# Patient Record
Sex: MALE | NOT HISPANIC OR LATINO | URBAN - METROPOLITAN AREA
[De-identification: names, ages, dates, MRNs, and addresses within clinical notes are randomized per-mention and may not be internally consistent; named-entity substitution may affect disease eponyms.]

---

## 2017-06-02 ENCOUNTER — IMPORTED ENCOUNTER (OUTPATIENT)
Dept: URBAN - METROPOLITAN AREA CLINIC 1 | Facility: CLINIC | Age: 67
End: 2017-06-02

## 2017-06-02 PROBLEM — H16.143: Noted: 2017-06-02

## 2017-06-02 PROBLEM — H25.813: Noted: 2017-06-02

## 2017-06-02 PROBLEM — H04.123: Noted: 2017-06-02

## 2017-06-02 PROBLEM — H35.3131: Noted: 2017-06-02

## 2017-06-02 PROBLEM — E11.9: Noted: 2017-06-02

## 2017-06-02 PROBLEM — H10.45: Noted: 2017-06-02

## 2017-06-02 PROBLEM — Z79.84: Noted: 2017-06-02

## 2017-06-02 PROCEDURE — 92014 COMPRE OPH EXAM EST PT 1/>: CPT

## 2017-06-02 PROCEDURE — 92250 FUNDUS PHOTOGRAPHY W/I&R: CPT

## 2017-06-02 PROCEDURE — 92015 DETERMINE REFRACTIVE STATE: CPT

## 2017-06-02 NOTE — PATIENT DISCUSSION
1.  DM Type II (Oral Meds) without sign of diabetic retinopathy and no blot heme on dilated retinal examination today OU No Macular Edema:  Discussed the pathophysiology of diabetes and its effect on the eye and risk of blindness. Stressed the importance of strong glucose control. Advised of importance of at least yearly dilated examinations but to contact us immediately for any problems or concerns. 2. Cataract OU: Observe for now without intervention. The patient was advised to contact us if any change or worsening of vision3. TOY w/ PEK OU-The use/continuation of artificial tears were recommended. 4.  ARMD OU Early/dry/stable. 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 Observe. Fm Hx. Importance of daily AREDS II study multivitamin and Amsler Grid checks discussed with patient. Patient to follow-up immediately with any new onset of decreased vision and/or metamorphopsia. 5. Allergic Conjunctivitis OU- Observe Letter to PCP Mrx for glasses given Return for an appointment in 6 mo 10 dfe with Dr. Nat Kaba.

## 2017-12-04 ENCOUNTER — IMPORTED ENCOUNTER (OUTPATIENT)
Dept: URBAN - METROPOLITAN AREA CLINIC 1 | Facility: CLINIC | Age: 67
End: 2017-12-04

## 2017-12-04 PROBLEM — E11.9: Noted: 2017-12-04

## 2017-12-04 PROBLEM — H10.45: Noted: 2017-12-04

## 2017-12-04 PROBLEM — H35.3131: Noted: 2017-12-04

## 2017-12-04 PROBLEM — Z79.84: Noted: 2017-12-04

## 2017-12-04 PROBLEM — H25.813: Noted: 2017-12-04

## 2017-12-04 PROBLEM — H04.123: Noted: 2017-12-04

## 2017-12-04 PROBLEM — H16.143: Noted: 2017-12-04

## 2017-12-04 PROCEDURE — 92012 INTRM OPH EXAM EST PATIENT: CPT

## 2017-12-04 NOTE — PATIENT DISCUSSION
1.  DM Type II (Oral Meds) without sign of diabetic retinopathy and no blot heme on dilated retinal examination today OU No Macular Edema:  Discussed the pathophysiology of diabetes and its effect on the eye and risk of blindness. Stressed the importance of strong glucose control. Advised of importance of at least yearly dilated examinations but to contact us immediately for any problems or concerns. 2. ARMD OU Early/dry/stable. Strong Fm hx. Importance of daily AREDS II study multivitamin and Amsler Grid checks discussed with patient. Patient to follow-up immediately with any new onset of decreased vision and/or metamorphopsia. 3. Cataract OU: Observe. 4.  TOY w/ increased PEK OU- Increase ATs to BID OU Routinely. 5.  Allergic Conjunctivitis OU- Zaditor BID OU PRN. Return for an appointment in 6 mo 30 glare with Dr. Mariajose Ridley.

## 2018-06-04 ENCOUNTER — IMPORTED ENCOUNTER (OUTPATIENT)
Dept: URBAN - METROPOLITAN AREA CLINIC 1 | Facility: CLINIC | Age: 68
End: 2018-06-04

## 2018-06-04 PROBLEM — Z79.84: Noted: 2018-06-04

## 2018-06-04 PROBLEM — H25.813: Noted: 2018-06-04

## 2018-06-04 PROBLEM — H35.3132: Noted: 2018-06-04

## 2018-06-04 PROBLEM — H16.143: Noted: 2018-06-04

## 2018-06-04 PROBLEM — E11.9: Noted: 2018-06-04

## 2018-06-04 PROBLEM — H04.123: Noted: 2018-06-04

## 2018-06-04 PROCEDURE — 92014 COMPRE OPH EXAM EST PT 1/>: CPT

## 2018-06-04 NOTE — PATIENT DISCUSSION
1.  DM Type II (Oral Medication). without sign of diabetic retinopathy and no blot heme on dilated retinal examination today OU No Macular Edema:  Discussed the pathophysiology of diabetes and its effect on the eye and risk of blindness. Stressed the importance of strong glucose control. Advised of importance of at least yearly dilated examinations but to contact us immediately for any problems or concerns. 2. TOY w/ PEK OU- Cont ATs TID OU Routinely. 3.  ARMD OU Intermediate/dry/stable. Importance of daily AREDS II study multivitamin and Amsler Grid checks discussed with patient. Patient to follow-up immediately with any new onset of decreased vision and/or metamorphopsia. 4. Cataract OU:  Visually Significant secondary to glare; Pt wishes to hold on scheduling till Fall. Will recheck patient in 3 months schedule Phaco. 5.  H/o Allergic Conjunctivitis OU- controlled. Cont Zaditor BID OU. Letter to PCP Return for an appointment in 3 mo 10 dfe glare MRx with Dr. Geovanna Joiner.

## 2018-06-04 NOTE — PATIENT DISCUSSION
ARMD OU Intermediate/dry/stable. Importance of daily AREDS II study multivitamin and Amsler Grid checks discussed with patient. Patient to follow-up immediately with any new onset of decreased vision and/or metamorphopsia.

## 2018-06-04 NOTE — PATIENT DISCUSSION
Cataract OU:  Visually Significant secondary to glare; Pt wishes to hold on scheduling till Fall.  Will recheck patient in 3 months schedule Phaco.

## 2018-09-06 ENCOUNTER — IMPORTED ENCOUNTER (OUTPATIENT)
Dept: URBAN - METROPOLITAN AREA CLINIC 1 | Facility: CLINIC | Age: 68
End: 2018-09-06

## 2018-09-06 PROBLEM — H25.813: Noted: 2018-09-06

## 2018-09-06 PROBLEM — H04.123: Noted: 2018-09-06

## 2018-09-06 PROBLEM — Z79.84: Noted: 2018-09-06

## 2018-09-06 PROBLEM — H16.143: Noted: 2018-09-06

## 2018-09-06 PROBLEM — H35.3132: Noted: 2018-09-06

## 2018-09-06 PROBLEM — E11.9: Noted: 2018-09-06

## 2018-09-06 PROCEDURE — 99213 OFFICE O/P EST LOW 20 MIN: CPT

## 2018-09-06 NOTE — PATIENT DISCUSSION
Cataract OU:  Visually Significant secondary to glare discussed the risks benefits alternatives and limitations of cataract surgery. The patient stated a full understanding and a desire to proceed with the procedure. The patient will need to return for preop appointment with cataract measurements and to have any additional questions answered and start pre-operative eye drops as directed.  Not MF candidatePhaco PCL OS then ODOtherwise follow-up 6mo/DFE

## 2018-09-06 NOTE — PATIENT DISCUSSION
1.  ARMD OU Mod/dry/stable. Importance of daily AREDS II study multivitamin and Amsler Grid checks discussed with patient. Patient to follow-up immediately with any new onset of decreased vision and/or metamorphopsia. 2. Cataract OU:  Visually Significant secondary to glare discussed the risks benefits alternatives and limitations of cataract surgery. The patient stated a full understanding and a desire to proceed with the procedure. The patient will need to return for preop appointment with cataract measurements and to have any additional questions answered and start pre-operative eye drops as directed. Not MF candidatePhaco PCL OS then ODOtherwise follow-up 6mo/DFE3. DM Type II without sign of diabetic retinopathy and no blot heme on dilated retinal examination today OU No Macular Edema:  Discussed the pathophysiology of diabetes and its effect on the eye and risk of blindness. Stressed the importance of strong glucose control. Advised of importance of at least yearly dilated examinations but to contact us immediately for any problems or concerns. 4. Type II diabetes controlled by oral medications. 5.  TOY w/ PEK OU-The increase of artificial tears QID OU Routinely were recommended. 6.  Return for an appointment for Ascan/HP with Dr. Reyna Kent.

## 2018-09-06 NOTE — PATIENT DISCUSSION
DM Type II without sign of diabetic retinopathy and no blot heme on dilated retinal examination today OU No Macular Edema:  Discussed the pathophysiology of diabetes and its effect on the eye and risk of blindness. Stressed the importance of strong glucose control. Advised of importance of at least yearly dilated examinations but to contact us immediately for any problems or concerns.

## 2018-10-22 ENCOUNTER — IMPORTED ENCOUNTER (OUTPATIENT)
Dept: URBAN - METROPOLITAN AREA CLINIC 1 | Facility: CLINIC | Age: 68
End: 2018-10-22

## 2018-10-22 PROBLEM — H25.812: Noted: 2018-10-22

## 2018-10-22 PROCEDURE — 92136 OPHTHALMIC BIOMETRY: CPT

## 2018-10-22 NOTE — PATIENT DISCUSSION
1. Cataract OS:  Visually Significant secondary to glare discussed the risks benefits alternatives and limitations of cataract surgery. The patient stated a full understanding and a desire to proceed with the procedure. Phaco PCL OS (Standard lens standard procedure) Return for an appointment for Return as scheduled with Dr. Mariajose Ridley.

## 2018-10-31 ENCOUNTER — IMPORTED ENCOUNTER (OUTPATIENT)
Dept: URBAN - METROPOLITAN AREA CLINIC 1 | Facility: CLINIC | Age: 68
End: 2018-10-31

## 2018-11-01 ENCOUNTER — IMPORTED ENCOUNTER (OUTPATIENT)
Dept: URBAN - METROPOLITAN AREA CLINIC 1 | Facility: CLINIC | Age: 68
End: 2018-11-01

## 2018-11-01 PROBLEM — Z96.1: Noted: 2018-11-01

## 2018-11-01 PROCEDURE — 99024 POSTOP FOLLOW-UP VISIT: CPT

## 2018-11-01 NOTE — PATIENT DISCUSSION
POD#1 CE/IOL Standard OS doing well. x 1 drop Combigan instilled OS in office todayContinue all 3 gtts as prescribed and until gone.  Prednisolone BID OS Acular Qdaily OS Vigamox TID OS Post op Warnings Reiterated RTC as scheduled

## 2018-11-06 ENCOUNTER — IMPORTED ENCOUNTER (OUTPATIENT)
Dept: URBAN - METROPOLITAN AREA CLINIC 1 | Facility: CLINIC | Age: 68
End: 2018-11-06

## 2018-11-06 PROBLEM — Z96.1: Noted: 2018-11-06

## 2018-11-06 PROBLEM — H25.811: Noted: 2018-11-06

## 2018-11-06 PROCEDURE — 92136 OPHTHALMIC BIOMETRY: CPT

## 2018-11-06 NOTE — PATIENT DISCUSSION
1.  Cataract OD: Visually Significant secondary to glare discussed the risks benefits alternatives and limitations of cataract surgery. The patient stated a full understanding and a desire to proceed with the procedure. The patient will need to start pre-operative eye drops as directed. Proceed w/ phaco PCL ODPt understands they will need glasses post-op to achieve their best corrected vision. 2.  POW#1  CE/IOL Standard OS doing well. Discontinue VigamoxContinue Prednisolone BID until gone. Continue Acular QD until gone. 3. Return for an appointment for sx OD with Dr. Mariajose Ridley.

## 2018-11-14 ENCOUNTER — IMPORTED ENCOUNTER (OUTPATIENT)
Dept: URBAN - METROPOLITAN AREA CLINIC 1 | Facility: CLINIC | Age: 68
End: 2018-11-14

## 2018-11-15 ENCOUNTER — IMPORTED ENCOUNTER (OUTPATIENT)
Dept: URBAN - METROPOLITAN AREA CLINIC 1 | Facility: CLINIC | Age: 68
End: 2018-11-15

## 2018-11-15 PROBLEM — Z96.1: Noted: 2018-11-15

## 2018-11-15 PROCEDURE — 99024 POSTOP FOLLOW-UP VISIT: CPT

## 2018-11-15 NOTE — PATIENT DISCUSSION
1. POD#1 CE/IOL Standard OD doing well. x 1 drop Combigan isntilled OD in office todayContinue all 3 gtts as prescribed and until gone. Prednisolone BID OD Acular Qdaily OD Vigamox TID OD  Post op Warnings Reiterated 2. POW#2  CE/IOL Standard OS doing well. Continue Prednisolone BID until gone. Continue Acular QD until gone. 3. Return for an appointment for KR as scheduled with Dr. Ksenia Woods.

## 2018-12-06 ENCOUNTER — IMPORTED ENCOUNTER (OUTPATIENT)
Dept: URBAN - METROPOLITAN AREA CLINIC 1 | Facility: CLINIC | Age: 68
End: 2018-12-06

## 2018-12-06 PROBLEM — Z96.1: Noted: 2018-12-06

## 2018-12-06 PROCEDURE — 99024 POSTOP FOLLOW-UP VISIT: CPT

## 2018-12-06 NOTE — PATIENT DISCUSSION
POW#3 Phaco/ PCL OU (Standard OU) Doing well Finish PO meds per schedule MRX for glasses given Return for an appointment in June 30 with Dr. Jose Guadalupe Trinidad.

## 2019-06-06 ENCOUNTER — IMPORTED ENCOUNTER (OUTPATIENT)
Dept: URBAN - METROPOLITAN AREA CLINIC 1 | Facility: CLINIC | Age: 69
End: 2019-06-06

## 2019-06-06 PROBLEM — Z96.1: Noted: 2019-06-06

## 2019-06-06 PROBLEM — H16.143: Noted: 2019-06-06

## 2019-06-06 PROBLEM — E11.9: Noted: 2019-06-06

## 2019-06-06 PROBLEM — H04.123: Noted: 2019-06-06

## 2019-06-06 PROBLEM — Z79.84: Noted: 2019-06-06

## 2019-06-06 PROBLEM — H35.3132: Noted: 2019-06-06

## 2019-06-06 PROBLEM — H10.45: Noted: 2019-06-06

## 2019-06-06 PROCEDURE — 92014 COMPRE OPH EXAM EST PT 1/>: CPT

## 2019-06-06 NOTE — PATIENT DISCUSSION
1.  DM Type II (Oral Medication) without sign of diabetic retinopathy and no blot heme on dilated retinal examination today OU No Macular Edema:  Discussed the pathophysiology of diabetes and its effect on the eye and risk of blindness. Stressed the importance of strong glucose control. Advised of importance of at least yearly dilated examinations but to contact us immediately for any problems or concerns. 2. ARMD OU Intermediate/dry/stable. Importance of daily AREDS II study multivitamin and Amsler Grid checks discussed with patient. Patient to follow-up immediately with any new onset of decreased vision and/or metamorphopsia. 3. TOY w/ PEK OU- Recommend ATs TID OU routinely 4. Allergic Conjunctivitis OU -- The condition was  discussed with the patient. Avoidance of allergens and cool compresses were recommended. 5. Pseudophakia OU - (Standard OU) Patient defers the refraction at today's visitReturn for an appointment in 6 months 10/DFE with Dr. Michael Natarajan.

## 2019-12-06 ENCOUNTER — IMPORTED ENCOUNTER (OUTPATIENT)
Dept: URBAN - METROPOLITAN AREA CLINIC 1 | Facility: CLINIC | Age: 69
End: 2019-12-06

## 2019-12-06 PROBLEM — Z79.84: Noted: 2019-12-06

## 2019-12-06 PROBLEM — E11.9: Noted: 2019-12-06

## 2019-12-06 PROBLEM — H35.3132: Noted: 2019-12-06

## 2019-12-06 PROCEDURE — 92015 DETERMINE REFRACTIVE STATE: CPT

## 2019-12-06 PROCEDURE — 92012 INTRM OPH EXAM EST PATIENT: CPT

## 2019-12-06 NOTE — PATIENT DISCUSSION
1.  ARMD OU Intermediate/dry/stable. Importance of daily AREDS II study multivitamin and Amsler Grid checks discussed with patient. Patient to follow-up immediately with any new onset of decreased vision and/or metamorphopsia. 2. DM Type II (Oral Meds)  without sign of diabetic retinopathy and no blot heme on dilated retinal examination today OU No Macular Edema:  Discussed the pathophysiology of diabetes and its effect on the eye and risk of blindness. Stressed the importance of strong glucose control. Advised of importance of at least yearly dilated examinations but to contact us immediately for any problems or concerns. 3. TOY w/ PEK OU- Cont ATs TID OU Routinely. 4.  Allergic Conjunctivitis OU -- The condition was  discussed with the patient. Avoidance of allergens and cool compresses were recommended. 5. Pseudophakia OU - (Standard OU) MRx givenReturn for an appointment in 6 months 27 with Dr. Reyna Kent.

## 2020-06-12 ENCOUNTER — IMPORTED ENCOUNTER (OUTPATIENT)
Dept: URBAN - METROPOLITAN AREA CLINIC 1 | Facility: CLINIC | Age: 70
End: 2020-06-12

## 2020-06-12 PROBLEM — H10.45: Noted: 2020-06-12

## 2020-06-12 PROBLEM — Z79.84: Noted: 2020-06-12

## 2020-06-12 PROBLEM — E11.9: Noted: 2020-06-12

## 2020-06-12 PROBLEM — Z96.1: Noted: 2020-06-12

## 2020-06-12 PROBLEM — H16.143: Noted: 2020-06-12

## 2020-06-12 PROBLEM — H04.123: Noted: 2020-06-12

## 2020-06-12 PROBLEM — H35.3132: Noted: 2020-06-12

## 2020-06-12 PROCEDURE — 92014 COMPRE OPH EXAM EST PT 1/>: CPT

## 2020-06-12 NOTE — PATIENT DISCUSSION
1.  DM Type II (Oral Medication) without sign of diabetic retinopathy and no blot heme on dilated retinal examination today OU No Macular Edema:  Discussed the pathophysiology of diabetes and its effect on the eye and risk of blindness. Stressed the importance of strong glucose control. Advised of importance of at least yearly dilated examinations but to contact us immediately for any problems or concerns. 2. ARMD OU Intermediate/dry/stable. Importance of daily AREDS II study multivitamin and Amsler Grid checks discussed with patient. Patient to follow-up immediately with any new onset of decreased vision and/or metamorphopsia. 3. TOY w/ PEK OU- Recommend ATs TID OU routinely  4. Pseudophakia OU - (Standard OU) 5. Allergic Conjunctivitis OU - The condition was  discussed with the patient. Avoidance of allergens and cool compresses were recommended. Patient deferred Manifest Rx today. Return for an appointment in 6 months 10/DFE with Dr. Ksenia Woods.

## 2021-01-14 ENCOUNTER — IMPORTED ENCOUNTER (OUTPATIENT)
Dept: URBAN - METROPOLITAN AREA CLINIC 1 | Facility: CLINIC | Age: 71
End: 2021-01-14

## 2021-01-14 PROBLEM — H35.3132: Noted: 2021-01-14

## 2021-01-14 PROCEDURE — 92012 INTRM OPH EXAM EST PATIENT: CPT

## 2021-01-14 NOTE — PATIENT DISCUSSION
1.  ARMD OU Intermediate/dry/stable. Importance of daily AREDS II study multivitamin and Amsler Grid checks discussed with patient. Patient to follow-up immediately with any new onset of decreased vision and/or metamorphopsia. 2. DM Type II (Oral Medication) without sign of diabetic retinopathy and no blot heme on dilated retinal examination today OU No Macular Edema:  Discussed the pathophysiology of diabetes and its effect on the eye and risk of blindness. Stressed the importance of strong glucose control. Advised of importance of at least yearly dilated examinations but to contact us immediately for any problems or concerns. 3. TOY w/ PEK OU- Recommend ATs TID OU routinely  4. Pseudophakia OU - (Standard OU) 5. Allergic Conjunctivitis OU - The condition was  discussed with the patient. Avoidance of allergens and cool compresses were recommended. Return for an appointment in 6 months 27 with Dr. Claudeen Cobble.

## 2021-01-14 NOTE — PATIENT DISCUSSION
DM Type II (Oral Medication) without sign of diabetic retinopathy and no blot heme on dilated retinal examination today OU No Macular Edema:  Discussed the pathophysiology of diabetes and its effect on the eye and risk of blindness. Stressed the importance of strong glucose control. Advised of importance of at least yearly dilated examinations but to contact us immediately for any problems or concerns. 3. TOY w/ PEK OU- Recommend ATs TID OU routinely  4. Pseudophakia OU - (Standard OU) 5. Allergic Conjunctivitis OU - The condition was  discussed with the patient. Avoidance of allergens and cool compresses were recommended. Return for an appointment in 6 months 27 with Dr. Leydi Patricio.

## 2021-07-22 ENCOUNTER — IMPORTED ENCOUNTER (OUTPATIENT)
Dept: URBAN - METROPOLITAN AREA CLINIC 1 | Facility: CLINIC | Age: 71
End: 2021-07-22

## 2021-07-22 PROBLEM — E11.9: Noted: 2021-07-22

## 2021-07-22 PROBLEM — Z79.84: Noted: 2021-07-22

## 2021-07-22 PROBLEM — Z96.1: Noted: 2021-07-22

## 2021-07-22 PROBLEM — H16.143: Noted: 2021-07-22

## 2021-07-22 PROBLEM — H10.45: Noted: 2021-07-22

## 2021-07-22 PROBLEM — H35.3132: Noted: 2021-07-22

## 2021-07-22 PROBLEM — H04.123: Noted: 2021-07-22

## 2021-07-22 PROCEDURE — 92015 DETERMINE REFRACTIVE STATE: CPT

## 2021-07-22 PROCEDURE — 99214 OFFICE O/P EST MOD 30 MIN: CPT

## 2021-07-22 NOTE — PATIENT DISCUSSION
1.  DM Type II (Oral Medication) without sign of diabetic retinopathy and no blot heme on dilated retinal examination today OU No Macular Edema:  Discussed the pathophysiology of diabetes and its effect on the eye and risk of blindness. Stressed the importance of strong glucose control. Advised of importance of at least yearly dilated examinations but to contact us immediately for any problems or concerns. 2. ARMD OU Intermediate/dry/stable. Importance of daily AREDS II study multivitamin and Amsler Grid checks discussed with patient. Patient to follow-up immediately with any new onset of decreased vision and/or metamorphopsia. 3. TOY w/ PEK OU- Recommend ATs TID OU routinely 4. Pseudophakia OU - (Standard OU) 5. Allergic Conjunctivitis OU - The condition was  discussed with the patient. Avoidance of allergens and cool compresses were recommended. Return for an appointment in 6 months DFE with Dr. Saba Mooney.

## 2022-01-24 ENCOUNTER — IMPORTED ENCOUNTER (OUTPATIENT)
Dept: URBAN - METROPOLITAN AREA CLINIC 1 | Facility: CLINIC | Age: 72
End: 2022-01-24

## 2022-01-24 PROBLEM — H04.123: Noted: 2022-01-24

## 2022-01-24 PROBLEM — H16.143: Noted: 2022-01-24

## 2022-01-24 PROBLEM — H35.3132: Noted: 2022-01-24

## 2022-01-24 PROBLEM — E11.9: Noted: 2022-01-24

## 2022-01-24 PROBLEM — Z79.84: Noted: 2022-01-24

## 2022-01-24 PROBLEM — Z96.1: Noted: 2022-01-24

## 2022-01-24 PROCEDURE — 92012 INTRM OPH EXAM EST PATIENT: CPT

## 2022-01-24 NOTE — PATIENT DISCUSSION
1.  DM Type II (Oral Med) without sign of diabetic retinopathy and no blot heme on dilated retinal examination today OU No Macular Edema:  Discussed the pathophysiology of diabetes and its effect on the eye and risk of blindness. Stressed the importance of strong glucose control. Advised of importance of at least yearly dilated examinations but to contact us immediately for any problems or concerns. 2. ARMD OU intermediate/dry/stable. Importance of daily AREDS II study multivitamin and Amsler Grid checks discussed with patient. Patient to follow-up immediately with any new onset of decreased vision and/or metamorphopsia. 3. TOY w/ PEK OU- Recommend ATs TID OU routinely 4. Pseudophakia OU - (Standard OU) 5. Allergic Conjunctivitis OU - The condition was  discussed with the patient. Avoidance of allergens and cool compresses were recommended. Return for an appointment in 6 months 27 with Dr. Claudeen Cobble.

## 2022-04-02 ASSESSMENT — VISUAL ACUITY
OS_GLARE: 20/80
OD_CC: 20/25+1
OS_GLARE: 20/80
OD_GLARE: 20/100
OS_CC: J2
OS_PH: SC 20/30
OS_GLARE: 20/80
OD_SC: 20/40
OD_SC: 20/25
OD_SC: 20/20-1
OD_CC: J2
OS_SC: 20/20-2
OS_GLARE: 20/80
OD_SC: 20/30
OS_SC: 20/25
OD_GLARE: 20/80
OS_CC: 20/40-1
OD_CC: J2
OS_SC: 20/20-2
OD_GLARE: 20/80
OD_GLARE: 20/80
OS_SC: 20/25
OS_CC: 20/25
OD_SC: 20/25-1
OS_CC: J2
OD_CC: J2
OD_SC: 20/25
OS_CC: J2
OS_SC: 20/25+2
OS_SC: 20/40-2
OD_GLARE: 20/80
OD_SC: 20/25
OS_SC: 20/40
OS_SC: 20/25
OS_CC: J2
OD_SC: 20/40
OS_CC: 20/40
OS_CC: 20/40-1
OD_SC: 20/25+2
OD_CC: J2
OD_CC: 20/30+2
OS_GLARE: 20/100
OS_SC: 20/20
OD_SC: 20/30
OD_GLARE: 20/80
OS_GLARE: 20/80
OS_SC: 20/25
OS_CC: J2
OS_CC: J2

## 2022-04-02 ASSESSMENT — TONOMETRY
OD_IOP_MMHG: 18
OS_IOP_MMHG: 16
OS_IOP_MMHG: 17
OS_IOP_MMHG: 17
OS_IOP_MMHG: 18
OD_IOP_MMHG: 16
OS_IOP_MMHG: 18
OS_IOP_MMHG: 19
OS_IOP_MMHG: 15
OD_IOP_MMHG: 16
OD_IOP_MMHG: 22
OD_IOP_MMHG: 18
OD_IOP_MMHG: 18
OS_IOP_MMHG: 16
OS_IOP_MMHG: 23
OD_IOP_MMHG: 18
OD_IOP_MMHG: 18
OS_IOP_MMHG: 22
OD_IOP_MMHG: 17
OS_IOP_MMHG: 18
OS_IOP_MMHG: 18
OD_IOP_MMHG: 17
OS_IOP_MMHG: 19
OS_IOP_MMHG: 17
OD_IOP_MMHG: 19
OD_IOP_MMHG: 19

## 2022-04-02 ASSESSMENT — KERATOMETRY
OS_AXISANGLE2_DEGREES: 089
OD_K2POWER_DIOPTERS: 42.75
OD_K1POWER_DIOPTERS: 43.75
OS_K2POWER_DIOPTERS: 42.75
OS_AXISANGLE_DEGREES: 179
OS_K1POWER_DIOPTERS: 44.25
OD_AXISANGLE_DEGREES: 174
OD_AXISANGLE2_DEGREES: 084

## 2022-07-19 ENCOUNTER — COMPREHENSIVE EXAM (OUTPATIENT)
Dept: URBAN - METROPOLITAN AREA CLINIC 1 | Facility: CLINIC | Age: 72
End: 2022-07-19

## 2022-07-19 DIAGNOSIS — Z96.1: ICD-10-CM

## 2022-07-19 DIAGNOSIS — E11.9: ICD-10-CM

## 2022-07-19 PROCEDURE — 92015 DETERMINE REFRACTIVE STATE: CPT

## 2022-07-19 PROCEDURE — 99214 OFFICE O/P EST MOD 30 MIN: CPT

## 2022-07-19 ASSESSMENT — VISUAL ACUITY
OD_CC: 20/40
OS_CC: 20/40

## 2022-07-19 ASSESSMENT — TONOMETRY
OD_IOP_MMHG: 19
OS_IOP_MMHG: 19

## 2023-08-15 ENCOUNTER — COMPREHENSIVE EXAM (OUTPATIENT)
Dept: URBAN - METROPOLITAN AREA CLINIC 1 | Facility: CLINIC | Age: 73
End: 2023-08-15

## 2023-08-15 DIAGNOSIS — H35.3132: ICD-10-CM

## 2023-08-15 DIAGNOSIS — H16.143: ICD-10-CM

## 2023-08-15 DIAGNOSIS — Z96.1: ICD-10-CM

## 2023-08-15 DIAGNOSIS — H10.45: ICD-10-CM

## 2023-08-15 DIAGNOSIS — E11.9: ICD-10-CM

## 2023-08-15 DIAGNOSIS — H04.123: ICD-10-CM

## 2023-08-15 PROCEDURE — 99214 OFFICE O/P EST MOD 30 MIN: CPT

## 2023-08-15 PROCEDURE — 92015 DETERMINE REFRACTIVE STATE: CPT

## 2023-08-15 ASSESSMENT — VISUAL ACUITY
OD_CC: J1
OD_BAT: 20/30
OS_BAT: 20/30
OD_CC: 20/50
OS_CC: J1
OS_CC: 20/40

## 2023-08-15 ASSESSMENT — TONOMETRY
OD_IOP_MMHG: 16
OS_IOP_MMHG: 16

## 2024-02-19 ENCOUNTER — FOLLOW UP (OUTPATIENT)
Dept: URBAN - METROPOLITAN AREA CLINIC 1 | Facility: CLINIC | Age: 74
End: 2024-02-19

## 2024-02-19 DIAGNOSIS — H35.3132: ICD-10-CM

## 2024-02-19 PROCEDURE — 99213 OFFICE O/P EST LOW 20 MIN: CPT

## 2024-02-19 ASSESSMENT — TONOMETRY
OS_IOP_MMHG: 16
OD_IOP_MMHG: 16

## 2024-08-26 ENCOUNTER — COMPREHENSIVE EXAM (OUTPATIENT)
Dept: URBAN - METROPOLITAN AREA CLINIC 1 | Facility: CLINIC | Age: 74
End: 2024-08-26

## 2024-08-26 DIAGNOSIS — H16.143: ICD-10-CM

## 2024-08-26 DIAGNOSIS — H04.123: ICD-10-CM

## 2024-08-26 DIAGNOSIS — H10.45: ICD-10-CM

## 2024-08-26 DIAGNOSIS — H35.3132: ICD-10-CM

## 2024-08-26 DIAGNOSIS — Z96.1: ICD-10-CM

## 2024-08-26 DIAGNOSIS — E11.9: ICD-10-CM

## 2024-08-26 PROCEDURE — 92134 CPTRZ OPH DX IMG PST SGM RTA: CPT

## 2024-08-26 PROCEDURE — 99214 OFFICE O/P EST MOD 30 MIN: CPT

## 2024-08-26 ASSESSMENT — TONOMETRY
OS_IOP_MMHG: 18
OD_IOP_MMHG: 18

## 2024-08-26 ASSESSMENT — VISUAL ACUITY
OD_CC: 20/25
OD_BAT: 20/25
OS_BAT: 20/70
OS_CC: 20/40
OU_CC: 20/25

## 2025-02-27 ENCOUNTER — FOLLOW UP (OUTPATIENT)
Age: 75
End: 2025-02-27

## 2025-02-27 DIAGNOSIS — H35.3132: ICD-10-CM

## 2025-02-27 DIAGNOSIS — H16.143: ICD-10-CM

## 2025-02-27 DIAGNOSIS — H04.123: ICD-10-CM

## 2025-02-27 PROCEDURE — 92134 CPTRZ OPH DX IMG PST SGM RTA: CPT

## 2025-02-27 PROCEDURE — 99213 OFFICE O/P EST LOW 20 MIN: CPT

## 2025-08-26 ENCOUNTER — COMPREHENSIVE EXAM (OUTPATIENT)
Age: 75
End: 2025-08-26

## 2025-08-26 DIAGNOSIS — H35.3132: ICD-10-CM

## 2025-08-26 DIAGNOSIS — E11.9: ICD-10-CM

## 2025-08-26 DIAGNOSIS — H16.143: ICD-10-CM

## 2025-08-26 DIAGNOSIS — H10.45: ICD-10-CM

## 2025-08-26 DIAGNOSIS — H04.123: ICD-10-CM

## 2025-08-26 DIAGNOSIS — Z96.1: ICD-10-CM

## 2025-08-26 PROCEDURE — 92134 CPTRZ OPH DX IMG PST SGM RTA: CPT

## 2025-08-26 PROCEDURE — 99214 OFFICE O/P EST MOD 30 MIN: CPT

## 2025-08-26 PROCEDURE — 92015 DETERMINE REFRACTIVE STATE: CPT
